# Patient Record
Sex: MALE | Race: OTHER
[De-identification: names, ages, dates, MRNs, and addresses within clinical notes are randomized per-mention and may not be internally consistent; named-entity substitution may affect disease eponyms.]

---

## 2019-09-23 ENCOUNTER — HOSPITAL ENCOUNTER (OUTPATIENT)
Dept: HOSPITAL 74 - JASU-SURG | Age: 54
Discharge: HOME | End: 2019-09-23
Attending: UROLOGY
Payer: COMMERCIAL

## 2019-09-23 VITALS — DIASTOLIC BLOOD PRESSURE: 68 MMHG | HEART RATE: 60 BPM | SYSTOLIC BLOOD PRESSURE: 119 MMHG | TEMPERATURE: 97.9 F

## 2019-09-23 VITALS — BODY MASS INDEX: 33.7 KG/M2

## 2019-09-23 DIAGNOSIS — N20.0: Primary | ICD-10-CM

## 2019-09-23 PROCEDURE — 0TF3XZZ FRAGMENTATION IN RIGHT KIDNEY PELVIS, EXTERNAL APPROACH: ICD-10-PCS | Performed by: UROLOGY

## 2019-09-23 NOTE — OP
DATE OF OPERATION:  09/23/2019

 

PREOPERATIVE DIAGNOSIS:  Right renal stone.

 

POSTOPERATIVE DIAGNOSIS:  Right renal stone.

 

PROCEDURE:  Right extracorporeal shock-wave lithotripsy.

 

ATTENDING:  Eusebio Bang MD 

 

ANESTHESIA:  Fractional.

 

DESCRIPTION OF PROCEDURE:  Patient was brought in the operating room.  Placed in

supine position on the operating room table.  Ultrasonography and fluoroscopy were

performed.  A 6-mm right lower pole stone was identified.  Anesthesia and

preoperative antibiotics were administered; 2500 impulses at 17 joules of power were

administered to the stone with excellent fragmentation of the stone under real time

ultrasonography and fluoroscopy.  The patient tolerated the procedure very well.

 

 

EUSEBIO BANG M.D.

 

[id number]

DD: 09/23/2019 16:57

DT: 09/23/2019 20:53

Job #:  [id number]

## 2019-09-23 NOTE — OP
Operative Note





- Note:


Operative Date: 09/23/19


Pre-Operative Diagnosis: Right kidney stone


Operation: Right ESWL


Findings: 





6 mm Right renal lower pole stone


Post-Operative Diagnosis: Same as Pre-op


Anesthesia: Fractional


Estimated Blood Loss (mls): 0


Drains, Volume Out (mls): 0


Operative Report Dictated: Yes